# Patient Record
(demographics unavailable — no encounter records)

---

## 2025-01-09 NOTE — ASSESSMENT
[FreeTextEntry1] : 78 year old female with a history of hypertension, hyperlipidemia, Sjogrens syndrome, Autoimmune Hemolytic Anemia, Bcell lyphoma, ITP, hx of coronary artery disease with occluded LAD per MD note (no prior cath, found on CT 11/2022/MRI), chronic diastolic heart failure with severe mitral regurgitation, evaluated for valve intervention with structural team and referred to HF team for optimization of her HFpEF. since she was diagnosed with A.fib which better controlled with BB and DIlt. tolerating Eliquis.   Pt has repeat TTE 1/3/2024 which showed LV EF45%, LV HUGO 5cms, Mod MR, Mild TR, PASP 28  HFmrEF - ACC/AHA stage C endorsing NYHA class 2 - Euvolemic on exam no need for diuretics at this time  - Cont Metoprolol XL 75 mg BID -will increase entresto 49-51mg BID - will add Ralston next - will need repeat labs 2 weeks with TTE after med changes  - continue Jardiance 10 mg QD   - Take your weight +BP daily and report weight gain of 3 lbs in one day or 5lbs in one week that is unrelieved by diuretic therapy. Notify office of HR <60 or BP systolic <90 for medication optimization. Limit your sodium intake and Keep fluids between 1.5-2L daily.  Afib  - cont eliquis - Continue BB  - cont EP f/u  Severe MR -Rt ECHO with mod MR - will need Structural follow up, after better BP control will obtain repeat TTE on follow up  - recommended scheduling f/u with Structural team  CAD - BB as stated above - Aspirin 81 mg QD  follow up in 3months f/u with Dr. lopes for cardiology

## 2025-01-09 NOTE — CARDIOLOGY SUMMARY
[de-identified] : 6/22: Abnormal Technetium SPECT images, consistent with a large anterior region of mild reversible myocardial ischemia with pharmacologic stress, in the presence of low normal/borderline depressed LV systolic function. LVEF rest and stress values are, respectively 47% and 46%.     [de-identified] : 3/9/23 LVIDd 3.5 cm LVEF 55%, mild LV hypertrophy, low normal LVSF, mildly enlarged RV, mildly impaired RVSF, LA severely dilated, nml RA, severe MR.

## 2025-01-09 NOTE — HISTORY OF PRESENT ILLNESS
[FreeTextEntry1] : Mr. Joyner 78-year-old female with a history of hypertension, hyperlipidemia, Sjogrens syndrome, Autoimmune Hemolytic Anemia, Bcell lyphoma, ITP, hx of coronary artery disease with occluded LAD per MD note (no prior cath, found on CT 11/2022/MRI), ECHO on 03/9/2023 - LV systolic function low normal, left atrium severely dilated, severe MR, mild pHTN, small pericardial effusion. She was evaluated by Structural team and referred for optimization of HFpEF . presents today for follow up.   She presents today with her . She reports feeling well, she denies CP, palpitations, syncope, LH/dizziness, orthopnea, PND, abdominal discomfort, and LE edema.   She takes her medications as directed. She has never been admitted to the hospital for HF.  Reports her home BP measurements are still elevated

## 2025-01-09 NOTE — PHYSICAL EXAM
[Well Developed] : well developed [Well Nourished] : well nourished [Normal Venous Pressure] : normal venous pressure [Normal S1, S2] : normal S1, S2 [Clear Lung Fields] : clear lung fields [Good Air Entry] : good air entry [Soft] : abdomen soft [Normal Gait] : normal gait [No Edema] : no edema [No Rash] : no rash [Moves all extremities] : moves all extremities [Alert and Oriented] : alert and oriented [de-identified] : ~6-8 JVP cmH20

## 2025-04-22 NOTE — ASSESSMENT
[FreeTextEntry1] : Patient is a 79 year old female enrolled in the [facility] program with a history of CAD (abnormal stress 2022, on ASA 81), HFrEF (EF 47%, followed with Dr. 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 Camp), severe mitral regurg, ITP, paroxysmal afib on Eliquis. Patient was recently discharged on 4/16/25 from [facility] for AMI/CHF.    Hospital chart reviewed and copied as per [facility] Discharge Summary: "79yoF with PMH of CAD (abnormal stress 2022, on ASA 81), HFrEF (EF 47%, followed with Dr. 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 Camp), severe mitral regurg, ITP, paroxysmal afib on Eliquis who presented with chest tightness and palpitations for two hours after after diarrhea and hemorrhoid pain overnight, now resolved, admitted for NSTEMI, afib RVR, hypokalemia. Patient came to the hospital for chest pressure and heart palpitations. She had a chest xray and CT scan which were both normal. She had a CT AP which showed terminal ileitis which could be related to her IBS/diarrhea, and her symptoms subsequently resolved. She had an TTE which showed moderate mitral regurgitation and was similar to prior TTE in March. She had continuous heart monitoring. Cardiology evaluated her and followed her care. She had daily blood work. She had a CCTA which showed occlusion of LAD which was known from prior scans. She declined cath. Her symptoms improved. She was deemed medically stable for discharge home with outpatient PCP and cardiology follow up".    Patient observed via telephone encounter and is alert and oriented x 3, in no acute distress. Patient states they are feeling well today. Patient states they are eating and drinking well. Patient reports compliance with medications. States she has f/u pending with cardiology.  Denies any cough, fever, chills, abdominal pain, palpitations, nausea, vomiting, diarrhea, lightheadedness, dizziness, shortness of breath, or chest pain.

## 2025-04-22 NOTE — PHYSICAL EXAM
[No Acute Distress] : no acute distress [No Respiratory Distress] : no respiratory distress  [Normal Insight/Judgement] : insight and judgment were intact [de-identified] : limited due to telephonic visit

## 2025-04-22 NOTE — HISTORY OF PRESENT ILLNESS
[Home] : at home, [unfilled] , at the time of the visit. [Other Location: e.g. Home (Enter Location, City,State)___] : at [unfilled] [Telephone (audio)] : This telephonic visit was provided via audio only technology. [Technical] : patient unable to effectively utilize tele-video due to technical issues. [Verbal consent obtained from patient] : the patient, [unfilled] [FreeTextEntry1] : Follow-up for discharge from [facility] for AMI/CHF.  [de-identified] : Patient is a 79 year old female enrolled in the [facility] program with a history of CAD (abnormal stress 2022, on ASA 81), HFrEF (EF 47%, followed with Dr. 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 Camp), severe mitral regurg, ITP, paroxysmal afib on Eliquis. Patient was recently discharged on 4/16/25 from [facility] for AMI/CHF.    Hospital chart reviewed and copied as per [facility] Discharge Summary: "79yoF with PMH of CAD (abnormal stress 2022, on ASA 81), HFrEF (EF 47%, followed with Dr. 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 Camp), severe mitral regurg, ITP, paroxysmal afib on Eliquis who presented with chest tightness and palpitations for two hours after after diarrhea and hemorrhoid pain overnight, now resolved, admitted for NSTEMI, afib RVR, hypokalemia. Patient came to the hospital for chest pressure and heart palpitations. She had a chest xray and CT scan which were both normal. She had a CT AP which showed terminal ileitis which could be related to her IBS/diarrhea, and her symptoms subsequently resolved. She had an TTE which showed moderate mitral regurgitation and was similar to prior TTE in March. She had continuous heart monitoring. Cardiology evaluated her and followed her care. She had daily blood work. She had a CCTA which showed occlusion of LAD which was known from prior scans. She declined cath. Her symptoms improved. She was deemed medically stable for discharge home with outpatient PCP and cardiology follow up".    Patient observed via telephone encounter and is alert and oriented x 3, in no acute distress. Patient states they are feeling well today. Patient states they are eating and drinking well. Patient reports compliance with medications. States she has f/u pending with cardiology.  Denies any cough, fever, chills, abdominal pain, palpitations, nausea, vomiting, diarrhea, lightheadedness, dizziness, shortness of breath, or chest pain.

## 2025-04-22 NOTE — REVIEW OF SYSTEMS
[Fever] : no fever [Chills] : no chills [Fatigue] : no fatigue [Hot Flashes] : no hot flashes [Night Sweats] : no night sweats [Chest Pain] : no chest pain [Palpitations] : no palpitations [Leg Claudication] : no leg claudication [Lower Ext Edema] : no lower extremity edema [Orthopnea] : no orthopnea [Shortness Of Breath] : no shortness of breath [Wheezing] : no wheezing [Cough] : no cough [Dyspnea on Exertion] : no dyspnea on exertion [Negative] : Heme/Lymph

## 2025-05-23 NOTE — PHYSICAL EXAM
[Well Developed] : well developed [Well Nourished] : well nourished [No Acute Distress] : no acute distress [No Carotid Bruit] : no carotid bruit [Normal S1, S2] : normal S1, S2 [No Murmur] : no murmur [No Rub] : no rub [Clear Lung Fields] : clear lung fields [Good Air Entry] : good air entry [No Respiratory Distress] : no respiratory distress  [Soft] : abdomen soft [Non Tender] : non-tender [Normal Gait] : normal gait [No Edema] : no edema [No Cyanosis] : no cyanosis [No Clubbing] : no clubbing [No Rash] : no rash [Moves all extremities] : moves all extremities [Normal Speech] : normal speech [Alert and Oriented] : alert and oriented

## 2025-05-27 NOTE — REVIEW OF SYSTEMS
[Dyspnea on exertion] : dyspnea during exertion [Palpitations] : palpitations [Fever] : no fever [Chills] : no chills [Chest Discomfort] : no chest discomfort [Orthopnea] : no orthopnea [Syncope] : no syncope [Cough] : no cough [Wheezing] : no wheezing [Abdominal Pain] : no abdominal pain [Nausea] : no nausea [Vomiting] : no vomiting [Dysuria] : no dysuria [Rash] : no rash [Weakness] : no weakness

## 2025-05-27 NOTE — ADDENDUM
[FreeTextEntry1] : I, Geena Arteaga, am scribing for and the presence of Dr. De La Paz the following sections: HPI, PMH,Family/social history, ROS, Physical Exam, Assessment / Plan.  I, Prudence De La Paz, personally performed the services described in the documentation, reviewed the documentation recorded by the scribe in my presence and it accurately and completely records my words and actions.

## 2025-05-27 NOTE — REASON FOR VISIT
[Arrhythmia/ECG Abnorrmalities] : arrhythmia/ECG abnormalities [Spouse] : spouse [FreeTextEntry3] : Dr. Stapleton

## 2025-05-27 NOTE — HISTORY OF PRESENT ILLNESS
[FreeTextEntry1] : 79 yo female with hypertension, hyperlipidemia, Sjogrens syndrome, Autoimmune Hemolytic Anemia, B-cell lyphoma, ITP, CAD with occluded LAD per CT scan 11/2022, mod MR, diagnosed paroxysmal atrial fibrillation. She saw a hematologist with [facility] who noted no contraindication to starting Eliquis. She is still getting occasional palpitations but not as often since starting medications.  Since our last follow up she had a repeat Echo showing mod MR and did not require valve surgery yet.  Her episodes of palpitations have been infrequent and not bothersome however in early April she had sustained palpitations with significant symptoms of palpitations and dizziness.  She presented to [facility] ED where she was found to be in AF with RVR.  She converted to SR w/ rate control in the ED.  Since that episode she has not had any further symptoms and has felt well.  She continues to monitor her rhythm with her apple watch.   She remains compliant with Eliquis.   EKG 6/2023: sinus rhythm 82bpm, LAE 2-week event monitor 9/2023: paroxysmal AF with RVR, some aberrant conduction during AF Echo 3/9/2023: EF 55%, mild LVH, low normal LV function, severe MR, mild pulm HTN, small pericardial effusion. Stress Test: 6/22: Abnormal Technetium SPECT images, consistent with a large anterior region of mild reversible myocardial ischemia with pharmacologic stress, in the presence of low normal/borderline depressed LV systolic function. LVEF rest and stress values are, respectively 47% and 46%.   CT scan 11/2022: total occlusion of small caliber LAD. mild disease in LCx and RCA. TTE 4/2025: normal LVEF, mildly dilated LA, mod MR, PASP 22mmHg, mild [place].  5/15/25: SR 88bpm

## 2025-05-27 NOTE — HISTORY OF PRESENT ILLNESS
[FreeTextEntry1] : 81 yo female with hypertension, hyperlipidemia, Sjogrens syndrome, Autoimmune Hemolytic Anemia, B-cell lyphoma, ITP, CAD with occluded LAD per CT scan 11/2022, mod MR, diagnosed paroxysmal atrial fibrillation. She saw a hematologist with [facility] who noted no contraindication to starting Eliquis. She is still getting occasional palpitations but not as often since starting medications.  Since our last follow up she had a repeat Echo showing mod MR and did not require valve surgery yet.  Her episodes of palpitations have been infrequent and not bothersome however in early April she had sustained palpitations with significant symptoms of palpitations and dizziness.  She presented to [facility] ED where she was found to be in AF with RVR.  She converted to SR w/ rate control in the ED.  Since that episode she has not had any further symptoms and has felt well.  She continues to monitor her rhythm with her apple watch.   She remains compliant with Eliquis.   EKG 6/2023: sinus rhythm 82bpm, LAE 2-week event monitor 9/2023: paroxysmal AF with RVR, some aberrant conduction during AF Echo 3/9/2023: EF 55%, mild LVH, low normal LV function, severe MR, mild pulm HTN, small pericardial effusion. Stress Test: 6/22: Abnormal Technetium SPECT images, consistent with a large anterior region of mild reversible myocardial ischemia with pharmacologic stress, in the presence of low normal/borderline depressed LV systolic function. LVEF rest and stress values are, respectively 47% and 46%.   CT scan 11/2022: total occlusion of small caliber LAD. mild disease in LCx and RCA. TTE 4/2025: normal LVEF, mildly dilated LA, mod MR, PASP 22mmHg, mild [place].  5/15/25: SR 88bpm